# Patient Record
Sex: FEMALE | Race: WHITE | NOT HISPANIC OR LATINO | Employment: STUDENT | URBAN - METROPOLITAN AREA
[De-identification: names, ages, dates, MRNs, and addresses within clinical notes are randomized per-mention and may not be internally consistent; named-entity substitution may affect disease eponyms.]

---
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
## 2023-01-11 ENCOUNTER — TELEPHONE (OUTPATIENT)
Dept: FAMILY MEDICINE | Facility: CLINIC | Age: 18
End: 2023-01-11
Payer: COMMERCIAL

## 2023-01-11 NOTE — TELEPHONE ENCOUNTER
Are there any outstanding tasks in patient's chart?    labs  2. Do we have outstanding/pending referrals?  n    3. Has the patient been seen in an ER, Urgent Care, or admitted since last visit?  n    4. Has patient seen any other health care providers since last visit?  n    5.  Has patient had any blood work or x-rays done since last visit?   Will complete labs at [facility]   Orders in system

## 2023-01-13 ENCOUNTER — LAB VISIT (OUTPATIENT)
Dept: LAB | Facility: HOSPITAL | Age: 18
End: 2023-01-13
Attending: NURSE PRACTITIONER
Payer: COMMERCIAL

## 2023-01-13 DIAGNOSIS — Z11.4 SCREENING FOR HIV (HUMAN IMMUNODEFICIENCY VIRUS): ICD-10-CM

## 2023-01-13 DIAGNOSIS — Z11.59 NEED FOR HEPATITIS C SCREENING TEST: ICD-10-CM

## 2023-01-13 DIAGNOSIS — Z00.00 ANNUAL PHYSICAL EXAM: ICD-10-CM

## 2023-01-13 LAB
ALBUMIN SERPL-MCNC: 3.6 G/DL (ref 3.5–5)
ALBUMIN/GLOB SERPL: 1 RATIO (ref 1.1–2)
ALP SERPL-CCNC: 83 UNIT/L (ref 40–150)
ALT SERPL-CCNC: 16 UNIT/L (ref 0–55)
AST SERPL-CCNC: 16 UNIT/L (ref 5–34)
BASOPHILS # BLD AUTO: 0.03 X10(3)/MCL (ref 0–0.2)
BASOPHILS NFR BLD AUTO: 0.5 %
BILIRUBIN DIRECT+TOT PNL SERPL-MCNC: 0.4 MG/DL
BUN SERPL-MCNC: 8.2 MG/DL (ref 7–18.7)
CALCIUM SERPL-MCNC: 9.8 MG/DL (ref 8.4–10.2)
CHLORIDE SERPL-SCNC: 110 MMOL/L (ref 98–107)
CHOLEST SERPL-MCNC: 217 MG/DL
CHOLEST/HDLC SERPL: 3 {RATIO} (ref 0–5)
CO2 SERPL-SCNC: 22 MMOL/L (ref 22–29)
CREAT SERPL-MCNC: 0.95 MG/DL (ref 0.55–1.02)
EOSINOPHIL # BLD AUTO: 0.05 X10(3)/MCL (ref 0–0.9)
EOSINOPHIL NFR BLD AUTO: 0.9 %
ERYTHROCYTE [DISTWIDTH] IN BLOOD BY AUTOMATED COUNT: 12.5 % (ref 11.5–17)
GFR SERPLBLD CREATININE-BSD FMLA CKD-EPI: >60 MLS/MIN/1.73/M2
GLOBULIN SER-MCNC: 3.6 GM/DL (ref 2.4–3.5)
GLUCOSE SERPL-MCNC: 92 MG/DL (ref 74–100)
HCT VFR BLD AUTO: 39.2 % (ref 37–47)
HDLC SERPL-MCNC: 70 MG/DL (ref 35–60)
HGB BLD-MCNC: 13.1 GM/DL (ref 12–16)
HIV 1+2 AB+HIV1 P24 AG SERPL QL IA: NONREACTIVE
IMM GRANULOCYTES # BLD AUTO: 0.02 X10(3)/MCL (ref 0–0.04)
IMM GRANULOCYTES NFR BLD AUTO: 0.4 %
LDLC SERPL CALC-MCNC: 129 MG/DL (ref 50–140)
LYMPHOCYTES # BLD AUTO: 1.66 X10(3)/MCL (ref 0.6–4.6)
LYMPHOCYTES NFR BLD AUTO: 29.7 %
MCH RBC QN AUTO: 29.8 PG
MCHC RBC AUTO-ENTMCNC: 33.4 MG/DL (ref 33–36)
MCV RBC AUTO: 89.1 FL (ref 80–94)
MONOCYTES # BLD AUTO: 0.41 X10(3)/MCL (ref 0.1–1.3)
MONOCYTES NFR BLD AUTO: 7.3 %
NEUTROPHILS # BLD AUTO: 3.42 X10(3)/MCL (ref 2.1–9.2)
NEUTROPHILS NFR BLD AUTO: 61.2 %
NRBC BLD AUTO-RTO: 0 %
PLATELET # BLD AUTO: 280 X10(3)/MCL (ref 130–400)
PMV BLD AUTO: 10.9 FL (ref 7.4–10.4)
POTASSIUM SERPL-SCNC: 4 MMOL/L (ref 3.5–5.1)
PROT SERPL-MCNC: 7.2 GM/DL (ref 6.4–8.3)
RBC # BLD AUTO: 4.4 X10(6)/MCL (ref 4.2–5.4)
SODIUM SERPL-SCNC: 141 MMOL/L (ref 136–145)
TRIGL SERPL-MCNC: 92 MG/DL (ref 37–140)
TSH SERPL-ACNC: 1.67 UIU/ML (ref 0.35–4.94)
VLDLC SERPL CALC-MCNC: 18 MG/DL
WBC # SPEC AUTO: 5.6 X10(3)/MCL (ref 4.5–11.5)

## 2023-01-13 PROCEDURE — 86803 HEPATITIS C AB TEST: CPT

## 2023-01-13 PROCEDURE — 87389 HIV-1 AG W/HIV-1&-2 AB AG IA: CPT

## 2023-01-13 PROCEDURE — 84443 ASSAY THYROID STIM HORMONE: CPT

## 2023-01-13 PROCEDURE — 85025 COMPLETE CBC W/AUTO DIFF WBC: CPT

## 2023-01-13 PROCEDURE — 80061 LIPID PANEL: CPT

## 2023-01-13 PROCEDURE — 36415 COLL VENOUS BLD VENIPUNCTURE: CPT

## 2023-01-13 PROCEDURE — 80053 COMPREHEN METABOLIC PANEL: CPT

## 2023-01-16 LAB — HCV AB SERPL QL IA: NONREACTIVE

## 2023-01-17 ENCOUNTER — OFFICE VISIT (OUTPATIENT)
Dept: FAMILY MEDICINE | Facility: CLINIC | Age: 18
End: 2023-01-17
Payer: COMMERCIAL

## 2023-01-17 VITALS
HEIGHT: 65 IN | OXYGEN SATURATION: 100 % | BODY MASS INDEX: 31.29 KG/M2 | SYSTOLIC BLOOD PRESSURE: 104 MMHG | DIASTOLIC BLOOD PRESSURE: 62 MMHG | WEIGHT: 187.81 LBS | HEART RATE: 78 BPM | RESPIRATION RATE: 16 BRPM | TEMPERATURE: 100 F

## 2023-01-17 DIAGNOSIS — Z00.00 ANNUAL PHYSICAL EXAM: Primary | ICD-10-CM

## 2023-01-17 DIAGNOSIS — F41.1 GENERALIZED ANXIETY DISORDER: ICD-10-CM

## 2023-01-17 DIAGNOSIS — F90.9 ATTENTION DEFICIT HYPERACTIVITY DISORDER (ADHD), UNSPECIFIED ADHD TYPE: ICD-10-CM

## 2023-01-17 DIAGNOSIS — Z11.59 NEED FOR HEPATITIS C SCREENING TEST: ICD-10-CM

## 2023-01-17 DIAGNOSIS — N92.6 IRREGULAR MENSTRUAL CYCLE: ICD-10-CM

## 2023-01-17 DIAGNOSIS — Z11.4 SCREENING FOR HIV (HUMAN IMMUNODEFICIENCY VIRUS): ICD-10-CM

## 2023-01-17 DIAGNOSIS — Z02.0 SCHOOL PHYSICAL EXAM: ICD-10-CM

## 2023-01-17 DIAGNOSIS — J06.9 UPPER RESPIRATORY TRACT INFECTION, UNSPECIFIED TYPE: ICD-10-CM

## 2023-01-17 DIAGNOSIS — E78.2 MIXED HYPERLIPIDEMIA: ICD-10-CM

## 2023-01-17 PROCEDURE — 1160F PR REVIEW ALL MEDS BY PRESCRIBER/CLIN PHARMACIST DOCUMENTED: ICD-10-PCS | Mod: CPTII,,, | Performed by: NURSE PRACTITIONER

## 2023-01-17 PROCEDURE — 3008F PR BODY MASS INDEX (BMI) DOCUMENTED: ICD-10-PCS | Mod: CPTII,,, | Performed by: NURSE PRACTITIONER

## 2023-01-17 PROCEDURE — 3078F DIAST BP <80 MM HG: CPT | Mod: CPTII,,, | Performed by: NURSE PRACTITIONER

## 2023-01-17 PROCEDURE — 3078F PR MOST RECENT DIASTOLIC BLOOD PRESSURE < 80 MM HG: ICD-10-PCS | Mod: CPTII,,, | Performed by: NURSE PRACTITIONER

## 2023-01-17 PROCEDURE — 1159F PR MEDICATION LIST DOCUMENTED IN MEDICAL RECORD: ICD-10-PCS | Mod: CPTII,,, | Performed by: NURSE PRACTITIONER

## 2023-01-17 PROCEDURE — 3074F PR MOST RECENT SYSTOLIC BLOOD PRESSURE < 130 MM HG: ICD-10-PCS | Mod: CPTII,,, | Performed by: NURSE PRACTITIONER

## 2023-01-17 PROCEDURE — 99395 PREV VISIT EST AGE 18-39: CPT | Mod: ,,, | Performed by: NURSE PRACTITIONER

## 2023-01-17 PROCEDURE — 1160F RVW MEDS BY RX/DR IN RCRD: CPT | Mod: CPTII,,, | Performed by: NURSE PRACTITIONER

## 2023-01-17 PROCEDURE — 3074F SYST BP LT 130 MM HG: CPT | Mod: CPTII,,, | Performed by: NURSE PRACTITIONER

## 2023-01-17 PROCEDURE — 3008F BODY MASS INDEX DOCD: CPT | Mod: CPTII,,, | Performed by: NURSE PRACTITIONER

## 2023-01-17 PROCEDURE — 1159F MED LIST DOCD IN RCRD: CPT | Mod: CPTII,,, | Performed by: NURSE PRACTITIONER

## 2023-01-17 PROCEDURE — 99395 PR PREVENTIVE VISIT,EST,18-39: ICD-10-PCS | Mod: ,,, | Performed by: NURSE PRACTITIONER

## 2023-01-17 RX ORDER — AZITHROMYCIN 250 MG/1
TABLET, FILM COATED ORAL
Qty: 6 TABLET | Refills: 0 | Status: SHIPPED | OUTPATIENT
Start: 2023-01-17 | End: 2023-01-22

## 2023-01-17 RX ORDER — METHYLPREDNISOLONE 4 MG/1
TABLET ORAL
Qty: 21 EACH | Refills: 0 | Status: SHIPPED | OUTPATIENT
Start: 2023-01-17 | End: 2023-02-07

## 2023-01-17 NOTE — ASSESSMENT & PLAN NOTE
Patient has not taken Paxil since the summer.  Feels like she is doing fine without it.  Follow-up 3 months.

## 2023-01-17 NOTE — LETTER
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  Student is currently under my care for ADHD.  She takes Concerta 36 mg daily for the treatment of her ADHD. She keeps all of her regularly scheduled visits with me every 3 months.     Sirisha Greene, CJP-BC

## 2023-01-17 NOTE — PROGRESS NOTES
Subjective:       Patient ID: Vee Aleman is a 17 y.o. female.    Chief Complaint: Annual Exam      HPI   This is a 17-year-old  female who presents to clinic today for an annual wellness exam.  Patient has a history of ADHD, anxiety, hyperlipidemia, irregular menstrual cycles.  Patient states that overall she is doing well with medication.  States that she is been having breakthrough bleeding with her menstrual cycle now for the past few months.  States that she saw Dr. Sinclair and they spoke about it and she was supposed to send her in a different kind of birth control but when she got to the pharmacy she realized that she just refilled the same exact birth control she has been on.  States that this happened at her previous years appointment to.  She is been complaining about being on this birth control now for the last couple of years and Dr. Sinclair says she is going to change it but sounds in the same prescription any ways.  She would like to look into getting a new gyn.  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  Also she has nasal congestion, cough that has been going on since November.  Was seen at urgent care but has not gotten any better.  Was not treated with antibiotics or steroids, just cough medicine.  Review of Systems   Constitutional: Negative.    HENT:  Positive for nasal congestion and sinus pressure/congestion.    Eyes: Negative.    Respiratory:  Positive for cough.    Cardiovascular: Negative.    Gastrointestinal: Negative.    Endocrine: Negative.    Genitourinary: Negative.  Positive for menstrual irregularity.   Musculoskeletal: Negative.    Integumentary:  Negative.   Allergic/Immunologic: Negative.    Neurological: Negative.    Hematological: Negative.    Psychiatric/Behavioral: Negative.     All other systems reviewed and are negative.  Comprehensive review of systems negative except as stated in HPI    The patient's Health Maintenance was reviewed and the  "following appears to be due:   There are no preventive care reminders to display for this patient.    Past Medical History:  Past Medical History:   Diagnosis Date    Anxiety disorder, unspecified     Attention-deficit hyperactivity disorder, unspecified type     Chronic constipation     Diabetes mellitus, type 2     Exercise-induced asthma     Obesity, unspecified     Verruca vulgaris      Past Surgical History:   Procedure Laterality Date    ADENOIDECTOMY       Review of patient's allergies indicates:  No Known Allergies  Current Outpatient Medications on File Prior to Visit   Medication Sig Dispense Refill    drospirenone-ethinyl estradioL (AUSTIN) 3-0.02 mg per tablet Take 1 tablet by mouth once daily.      methylphenidate HCl (CONCERTA) 36 MG CR tablet Take 1 tablet (36 mg total) by mouth once daily. 30 tablet 0    [DISCONTINUED] mupirocin (BACTROBAN) 2 % ointment Apply topically 3 (three) times daily. 22 g 0    [DISCONTINUED] paroxetine (PAXIL) 20 MG tablet Take 20 mg by mouth once daily.      [DISCONTINUED] vitamin D (VITAMIN D3) 1000 units Tab Take 1,000 Units by mouth once daily.      [DISCONTINUED] zinc gluconate 50 mg tablet Take 50 mg by mouth once daily.       No current facility-administered medications on file prior to visit.     Social History     Socioeconomic History    Marital status: Single   Tobacco Use    Smoking status: Never    Smokeless tobacco: Never   Substance and Sexual Activity    Alcohol use: Yes     Comment: occasional    Drug use: Never    Sexual activity: Not Currently     Family History   Problem Relation Age of Onset    No Known Problems Mother     No Known Problems Father        Objective:       /62 (BP Location: Left arm)   Pulse 78   Temp 99.5 °F (37.5 °C) (Oral)   Resp 16   Ht 5' 4.5" (1.638 m)   Wt 85.2 kg (187 lb 12.8 oz)   SpO2 100%   BMI 31.74 kg/m²      Physical Exam  Vitals and nursing note reviewed.   Constitutional:       Appearance: Normal appearance. She is " obese.   HENT:      Head: Normocephalic and atraumatic.      Right Ear: Tympanic membrane, ear canal and external ear normal.      Left Ear: Tympanic membrane, ear canal and external ear normal.      Nose: Congestion present.      Mouth/Throat:      Mouth: Mucous membranes are moist.      Pharynx: Oropharynx is clear.   Eyes:      Extraocular Movements: Extraocular movements intact.      Conjunctiva/sclera: Conjunctivae normal.      Pupils: Pupils are equal, round, and reactive to light.   Cardiovascular:      Rate and Rhythm: Normal rate and regular rhythm.      Heart sounds: Normal heart sounds.   Pulmonary:      Effort: Pulmonary effort is normal.      Breath sounds: Normal breath sounds.   Abdominal:      General: Abdomen is flat. Bowel sounds are normal.      Palpations: Abdomen is soft.   Musculoskeletal:         General: Normal range of motion.      Cervical back: Normal range of motion and neck supple.   Skin:     General: Skin is warm and dry.   Neurological:      General: No focal deficit present.      Mental Status: She is alert and oriented to person, place, and time.   Psychiatric:         Mood and Affect: Mood normal.         Behavior: Behavior normal.         Thought Content: Thought content normal.         Judgment: Judgment normal.       Labs  Lab Visit on 01/13/2023   Component Date Value Ref Range Status    Sodium Level 01/13/2023 141  136 - 145 mmol/L Final    Potassium Level 01/13/2023 4.0  3.5 - 5.1 mmol/L Final    Chloride 01/13/2023 110 (H)  98 - 107 mmol/L Final    Carbon Dioxide 01/13/2023 22  22 - 29 mmol/L Final    Glucose Level 01/13/2023 92  74 - 100 mg/dL Final    Blood Urea Nitrogen 01/13/2023 8.2  7.0 - 18.7 mg/dL Final    Creatinine 01/13/2023 0.95  0.55 - 1.02 mg/dL Final    Calcium Level Total 01/13/2023 9.8  8.4 - 10.2 mg/dL Final    Protein Total 01/13/2023 7.2  6.4 - 8.3 gm/dL Final    Albumin Level 01/13/2023 3.6  3.5 - 5.0 g/dL Final    Globulin 01/13/2023 3.6 (H)  2.4 - 3.5  gm/dL Final    Albumin/Globulin Ratio 01/13/2023 1.0 (L)  1.1 - 2.0 ratio Final    Bilirubin Total 01/13/2023 0.4  <=1.5 mg/dL Final    Alkaline Phosphatase 01/13/2023 83  40 - 150 unit/L Final    Alanine Aminotransferase 01/13/2023 16  0 - 55 unit/L Final    Aspartate Aminotransferase 01/13/2023 16  5 - 34 unit/L Final    eGFR 01/13/2023 >60  mls/min/1.73/m2 Final    Cholesterol Total 01/13/2023 217 (H)  <=200 mg/dL Final    HDL Cholesterol 01/13/2023 70 (H)  35 - 60 mg/dL Final    Triglyceride 01/13/2023 92  37 - 140 mg/dL Final    Cholesterol/HDL Ratio 01/13/2023 3  0 - 5 Final    Very Low Density Lipoprotein 01/13/2023 18   Final    LDL Cholesterol 01/13/2023 129.00  50.00 - 140.00 mg/dL Final    Thyroid Stimulating Hormone 01/13/2023 1.666  0.350 - 4.940 uIU/mL Final    Hepatitis C Antibody 01/13/2023 Nonreactive  Nonreactive Final    HIV 01/13/2023 Nonreactive  Nonreactive Final    WBC 01/13/2023 5.6  4.5 - 11.5 x10(3)/mcL Final    RBC 01/13/2023 4.40  4.20 - 5.40 x10(6)/mcL Final    Hgb 01/13/2023 13.1  12.0 - 16.0 gm/dL Final    Hct 01/13/2023 39.2  37.0 - 47.0 % Final    MCV 01/13/2023 89.1  80.0 - 94.0 fL Final    MCH 01/13/2023 29.8  pg Final    MCHC 01/13/2023 33.4  33.0 - 36.0 mg/dL Final    RDW 01/13/2023 12.5  11.5 - 17.0 % Final    Platelet 01/13/2023 280  130 - 400 x10(3)/mcL Final    MPV 01/13/2023 10.9 (H)  7.4 - 10.4 fL Final    Neut % 01/13/2023 61.2  % Final    Lymph % 01/13/2023 29.7  % Final    Mono % 01/13/2023 7.3  % Final    Eos % 01/13/2023 0.9  % Final    Basophil % 01/13/2023 0.5  % Final    Lymph # 01/13/2023 1.66  0.6 - 4.6 x10(3)/mcL Final    Neut # 01/13/2023 3.42  2.1 - 9.2 x10(3)/mcL Final    Mono # 01/13/2023 0.41  0.1 - 1.3 x10(3)/mcL Final    Eos # 01/13/2023 0.05  0 - 0.9 x10(3)/mcL Final    Baso # 01/13/2023 0.03  0 - 0.2 x10(3)/mcL Final    IG# 01/13/2023 0.02  0 - 0.04 x10(3)/mcL Final    IG% 01/13/2023 0.4  % Final    NRBC% 01/13/2023 0.0  % Final         Assessment and  Plan       ICD-10-CM ICD-9-CM   1. Annual physical exam  Z00.00 V70.0   2. Need for hepatitis C screening test  Z11.59 V73.89   3. Screening for HIV (human immunodeficiency virus)  Z11.4 V73.89   4. Attention deficit hyperactivity disorder (ADHD), unspecified ADHD type  F90.9 314.01   5. Generalized anxiety disorder  F41.1 300.02   6. Mixed hyperlipidemia  E78.2 272.2   7. Irregular menstrual cycle  N92.6 626.4        1. Annual physical exam    2. Need for hepatitis C screening test  Comments:  Nonreactive    3. Screening for HIV (human immunodeficiency virus)  Comments:  Nonreactive    4. Attention deficit hyperactivity disorder (ADHD), unspecified ADHD type  Overview:  DX as a child - tried Concerta, Vyvanse, Adzenys - all increased her anxiety and gave her nightmares  08/29/2018 - Mydayis 12.5 mg daily  09/14/2018 - DC Mydayis, made her irritable, start Focalin XR 15 mg daily  09/26/2018 - Focalin XR 20 mg   11/14/2018 - Focalin XR 30 mg  October 2019 - stopped ADHD medication, patient wanted to try without it   09/01/2020 - Adderall XR 20 mg daily  11/09/2020 - Adderall XR 25 mg  02/11/2021 - stop Adderall due to anger issues, start Concerta 36 mg            5. Generalized anxiety disorder  Overview:  07/12/2021 - Zoloft 25 mg daily  04/21/2022 - DC Zoloft (wasn't working), start Paxil 20 mg       6. Mixed hyperlipidemia    7. Irregular menstrual cycle  -     Ambulatory referral/consult to Obstetrics / Gynecology; Future; Expected date: 01/24/2023           Follow up in about 3 months (around 4/17/2023) for follow up.

## 2023-01-24 ENCOUNTER — PATIENT MESSAGE (OUTPATIENT)
Dept: FAMILY MEDICINE | Facility: CLINIC | Age: 18
End: 2023-01-24
Payer: COMMERCIAL

## 2023-01-24 RX ORDER — METRONIDAZOLE 7.5 MG/G
1 GEL VAGINAL 2 TIMES DAILY
Qty: 10 APPLICATOR | Refills: 1 | Status: SHIPPED | OUTPATIENT
Start: 2023-01-24 | End: 2023-04-19 | Stop reason: ALTCHOICE

## 2023-04-12 ENCOUNTER — TELEPHONE (OUTPATIENT)
Dept: FAMILY MEDICINE | Facility: CLINIC | Age: 18
End: 2023-04-12
Payer: COMMERCIAL

## 2023-04-19 ENCOUNTER — OFFICE VISIT (OUTPATIENT)
Dept: FAMILY MEDICINE | Facility: CLINIC | Age: 18
End: 2023-04-19
Payer: COMMERCIAL

## 2023-04-19 VITALS
RESPIRATION RATE: 16 BRPM | TEMPERATURE: 99 F | HEART RATE: 68 BPM | WEIGHT: 186 LBS | OXYGEN SATURATION: 98 % | HEIGHT: 65 IN | SYSTOLIC BLOOD PRESSURE: 110 MMHG | BODY MASS INDEX: 30.99 KG/M2 | DIASTOLIC BLOOD PRESSURE: 72 MMHG

## 2023-04-19 DIAGNOSIS — F90.9 ATTENTION DEFICIT HYPERACTIVITY DISORDER (ADHD), UNSPECIFIED ADHD TYPE: ICD-10-CM

## 2023-04-19 DIAGNOSIS — N92.6 IRREGULAR MENSTRUAL CYCLE: ICD-10-CM

## 2023-04-19 DIAGNOSIS — F41.1 GENERALIZED ANXIETY DISORDER: ICD-10-CM

## 2023-04-19 PROCEDURE — 99213 PR OFFICE/OUTPT VISIT, EST, LEVL III, 20-29 MIN: ICD-10-PCS | Mod: ,,, | Performed by: NURSE PRACTITIONER

## 2023-04-19 PROCEDURE — 3078F DIAST BP <80 MM HG: CPT | Mod: CPTII,,, | Performed by: NURSE PRACTITIONER

## 2023-04-19 PROCEDURE — 3008F BODY MASS INDEX DOCD: CPT | Mod: CPTII,,, | Performed by: NURSE PRACTITIONER

## 2023-04-19 PROCEDURE — 3074F PR MOST RECENT SYSTOLIC BLOOD PRESSURE < 130 MM HG: ICD-10-PCS | Mod: CPTII,,, | Performed by: NURSE PRACTITIONER

## 2023-04-19 PROCEDURE — 1159F MED LIST DOCD IN RCRD: CPT | Mod: CPTII,,, | Performed by: NURSE PRACTITIONER

## 2023-04-19 PROCEDURE — 3008F PR BODY MASS INDEX (BMI) DOCUMENTED: ICD-10-PCS | Mod: CPTII,,, | Performed by: NURSE PRACTITIONER

## 2023-04-19 PROCEDURE — 1159F PR MEDICATION LIST DOCUMENTED IN MEDICAL RECORD: ICD-10-PCS | Mod: CPTII,,, | Performed by: NURSE PRACTITIONER

## 2023-04-19 PROCEDURE — 1160F PR REVIEW ALL MEDS BY PRESCRIBER/CLIN PHARMACIST DOCUMENTED: ICD-10-PCS | Mod: CPTII,,, | Performed by: NURSE PRACTITIONER

## 2023-04-19 PROCEDURE — 99213 OFFICE O/P EST LOW 20 MIN: CPT | Mod: ,,, | Performed by: NURSE PRACTITIONER

## 2023-04-19 PROCEDURE — 3074F SYST BP LT 130 MM HG: CPT | Mod: CPTII,,, | Performed by: NURSE PRACTITIONER

## 2023-04-19 PROCEDURE — 3078F PR MOST RECENT DIASTOLIC BLOOD PRESSURE < 80 MM HG: ICD-10-PCS | Mod: CPTII,,, | Performed by: NURSE PRACTITIONER

## 2023-04-19 PROCEDURE — 1160F RVW MEDS BY RX/DR IN RCRD: CPT | Mod: CPTII,,, | Performed by: NURSE PRACTITIONER

## 2023-04-19 RX ORDER — FLURBIPROFEN 100 MG/1
100 TABLET, FILM COATED ORAL 3 TIMES DAILY
COMMUNITY
Start: 2023-02-07

## 2023-04-19 RX ORDER — DESOGESTREL AND ETHINYL ESTRADIOL 0.15-0.03
1 KIT ORAL
COMMUNITY
Start: 2023-04-04

## 2023-04-19 NOTE — ASSESSMENT & PLAN NOTE
Patient stopped her Concerta due to mood swings.  States that she is no longer in school and is doing okay without it.  Follow-up in January with wellness as scheduled.

## 2023-04-19 NOTE — LETTER
AUTHORIZATION FOR RELEASE OF   CONFIDENTIAL INFORMATION    Dear Maribeth Michelle NP,    We are seeing Vee Aleman, date of birth 2005, in the clinic at [facility]. DENISE Muniz is the patient's PCP. Vee Aleman has an outstanding lab/procedure at the time we reviewed her chart. In order to help keep her health information updated, she has authorized us to request the following medical record(s):        (  )  MAMMOGRAM                                      (  )  COLONOSCOPY      (  )  PAP SMEAR                                          (  )  OUTSIDE LAB RESULTS     (  )  DEXA SCAN                                          (  )  EYE EXAM            (  )  FOOT EXAM                                          (  )  ENTIRE RECORD     (  )  OUTSIDE IMMUNIZATIONS                 (X)  OFFICE VISIT NOTE         Please fax records to [facility], Kathryn F Duplantis, FNP, [phone]     If you have any questions, please contact us at [phone].          Patient Name: Vee Aleman  : 2005  Patient Phone #: [phone]

## 2023-04-19 NOTE — ASSESSMENT & PLAN NOTE
Patient recently saw gyn and had birth control changed.  They started her on a sample medication, she thinks it is Wellbutrin, for  Her anxiety.  Instructed patient to please send us a [employer] message when she gets home to let us know what medication she is taking.  Patient verbalized understanding.  Follow-up in January with wellness as scheduled.

## 2023-04-19 NOTE — PROGRESS NOTES
Subjective:       Patient ID: Vee Aleman is a 17 y.o. female.    Chief Complaint: ADHD (3 month f/u/D/c'd med d/t feeling agitated when medication was wearing off.) and Anxiety (3 month f/u)      HPI     This is a 17-year-old  female who presents to clinic today for 3 month follow-up for ADHD and anxiety.  Patient states she stopped her ADHD medication a few months ago because she was having mood swings again when it was wearing off.  States that she saw a gyn and her birth control was changed.  This is helping her irregular cycles.  States the gyn put her on Wellbutrin for her anxiety.  She follows up with them soon.  Review of Systems  Comprehensive review of systems negative except as stated in HPI    The patient's Health Maintenance was reviewed and the following appears to be due:   There are no preventive care reminders to display for this patient.    Past Medical History:  Past Medical History:   Diagnosis Date    Anxiety disorder, unspecified     Attention-deficit hyperactivity disorder, unspecified type     Chronic constipation     Diabetes mellitus, type 2     Exercise-induced asthma     Obesity, unspecified     Verruca vulgaris      Past Surgical History:   Procedure Laterality Date    ADENOIDECTOMY       Review of patient's allergies indicates:  No Known Allergies  Current Outpatient Medications on File Prior to Visit   Medication Sig Dispense Refill    flurbiprofen (ANSAID) 100 MG tablet Take 100 mg by mouth 3 (three) times daily.      ISIBLOOM 0.15-0.03 mg per tablet Take 1 tablet by mouth.      bupropion HCl (WELLBUTRIN ORAL) Take by mouth.      [DISCONTINUED] drospirenone-ethinyl estradioL (AUSTIN) 3-0.02 mg per tablet Take 1 tablet by mouth once daily.      [DISCONTINUED] methylphenidate HCl (CONCERTA) 36 MG CR tablet Take 1 tablet (36 mg total) by mouth once daily. 30 tablet 0    [DISCONTINUED] metroNIDAZOLE (METROGEL) 0.75 % (37.5mg/5 gram) vaginal gel Place 1 applicator vaginally  "2 (two) times daily. 10 applicator 1     No current facility-administered medications on file prior to visit.     Social History     Socioeconomic History    Marital status: Single   Tobacco Use    Smoking status: Never     Passive exposure: Never    Smokeless tobacco: Never   Substance and Sexual Activity    Alcohol use: Yes     Comment: occasional    Drug use: Never    Sexual activity: Not Currently     Partners: Male     Social Determinants of Health     Financial Resource Strain: Unknown    Difficulty of Paying Living Expenses: Patient refused   Food Insecurity: No Food Insecurity    Worried About Running Out of Food in the Last Year: Never true    Ran Out of Food in the Last Year: Never true   Transportation Needs: No Transportation Needs    Lack of Transportation (Medical): No    Lack of Transportation (Non-Medical): No   Physical Activity: Sufficiently Active    Days of Exercise per Week: 4 days    Minutes of Exercise per Session: 50 min   Stress: Stress Concern Present    Feeling of Stress : To some extent   Social Connections: Socially Isolated    Frequency of Communication with Friends and Family: Twice a week    Frequency of Social Gatherings with Friends and Family: Once a week    Attends [employer] Services: Never    Active Member of Clubs or Organizations: No    Attends Club or Organization Meetings: Never    Marital Status: Never    Housing Stability: Low Risk     Unable to Pay for Housing in the Last Year: No    Number of Places Lived in the Last Year: 2    Unstable Housing in the Last Year: No     Family History   Problem Relation Age of Onset    No Known Problems Mother     No Known Problems Father        Objective:       /72 (BP Location: Left arm)   Pulse 68   Temp 98.8 °F (37.1 °C) (Oral)   Resp 16   Ht 5' 4.5" (1.638 m)   Wt 84.4 kg (186 lb)   LMP 03/29/2023 (Within Days)   SpO2 98%   BMI 31.43 kg/m²      Physical Exam  Vitals and nursing note reviewed.   Constitutional:     "   Appearance: Normal appearance.   HENT:      Head: Normocephalic and atraumatic.      Right Ear: Tympanic membrane, ear canal and external ear normal.      Left Ear: Tympanic membrane, ear canal and external ear normal.      Nose: Nose normal.      Mouth/Throat:      Mouth: Mucous membranes are moist.      Pharynx: Oropharynx is clear.   Eyes:      Extraocular Movements: Extraocular movements intact.      Conjunctiva/sclera: Conjunctivae normal.      Pupils: Pupils are equal, round, and reactive to light.   Cardiovascular:      Rate and Rhythm: Normal rate and regular rhythm.      Heart sounds: Normal heart sounds.   Pulmonary:      Effort: Pulmonary effort is normal.      Breath sounds: Normal breath sounds.   Musculoskeletal:         General: Normal range of motion.      Cervical back: Normal range of motion and neck supple.   Skin:     General: Skin is warm and dry.   Neurological:      General: No focal deficit present.      Mental Status: She is alert and oriented to person, place, and time.   Psychiatric:         Mood and Affect: Mood normal.         Behavior: Behavior normal.         Thought Content: Thought content normal.         Judgment: Judgment normal.           Assessment and Plan       ICD-10-CM ICD-9-CM   1. Attention deficit hyperactivity disorder (ADHD), unspecified ADHD type  F90.9 314.01   2. Generalized anxiety disorder  F41.1 300.02   3. Irregular menstrual cycle  N92.6 626.4        1. Attention deficit hyperactivity disorder (ADHD), unspecified ADHD type  Overview:  DX as a child - tried Concerta, Vyvanse, Adzenys - all increased her anxiety and gave her nightmares  08/29/2018 - Mydayis 12.5 mg daily  09/14/2018 - DC Mydayis, made her irritable, start Focalin XR 15 mg daily  09/26/2018 - Focalin XR 20 mg   11/14/2018 - Focalin XR 30 mg  October 2019 - stopped ADHD medication, patient wanted to try without it   09/01/2020 - Adderall XR 20 mg daily  11/09/2020 - Adderall XR 25 mg  02/11/2021 -  stop Adderall due to anger issues, start Concerta 36 mg  January 2023 - stopped Concerta due to mood swings            Assessment & Plan:   Patient stopped her Concerta due to mood swings.  States that she is no longer in school and is doing okay without it.  Follow-up in January with wellness as scheduled.      2. Generalized anxiety disorder  Overview:  07/12/2021 - Zoloft 25 mg daily  04/21/2022 - DC Zoloft (wasn't working), start Paxil 20 mg   June 2022 - discontinued Paxil    Assessment & Plan:    Patient recently saw gyn and had birth control changed.  They started her on a sample medication, she thinks it is Wellbutrin, for  Her anxiety.  Instructed patient to please send us a [employer] message when she gets home to let us know what medication she is taking.  Patient verbalized understanding.  Follow-up in January with wellness as scheduled.      3. Irregular menstrual cycle  Overview:  Started Diana in 2019 per Dr Sinclair    Assessment & Plan:    Patient was referred to a different gyn because Dr. Chatterjee  Was not taking new patients.  She saw Maribeth Michelle nurse practitioner, and birth control was changed.  This is helping.  Follow-up with gyn as scheduled.             Follow up in 9 months (on 1/18/2024) for Annual.

## 2023-04-19 NOTE — ASSESSMENT & PLAN NOTE
Patient was referred to a different gyn because Dr. Chatterjee  Was not taking new patients.  She saw Maribeth Michelle nurse practitioner, and birth control was changed.  This is helping.  Follow-up with gyn as scheduled.

## 2023-04-24 PROBLEM — Z00.00 ANNUAL PHYSICAL EXAM: Status: RESOLVED | Noted: 2023-01-17 | Resolved: 2023-04-24

## 2023-07-06 ENCOUNTER — PATIENT MESSAGE (OUTPATIENT)
Dept: FAMILY MEDICINE | Facility: CLINIC | Age: 18
End: 2023-07-06
Payer: COMMERCIAL

## 2024-01-10 ENCOUNTER — TELEPHONE (OUTPATIENT)
Dept: FAMILY MEDICINE | Facility: CLINIC | Age: 19
End: 2024-01-10
Payer: COMMERCIAL

## 2024-01-10 ENCOUNTER — PATIENT MESSAGE (OUTPATIENT)
Dept: FAMILY MEDICINE | Facility: CLINIC | Age: 19
End: 2024-01-10
Payer: COMMERCIAL

## 2024-01-10 DIAGNOSIS — Z00.00 ANNUAL PHYSICAL EXAM: Primary | ICD-10-CM

## 2024-01-10 DIAGNOSIS — Z13.1 SCREENING FOR DIABETES MELLITUS: ICD-10-CM

## 2024-01-10 NOTE — TELEPHONE ENCOUNTER
No answer on 1/10/24 at 9:15.  Left message reminding patient about her visit and labs needed prior to appointment.  Message sent to patient through portal.

## 2024-02-26 ENCOUNTER — TELEPHONE (OUTPATIENT)
Dept: FAMILY MEDICINE | Facility: CLINIC | Age: 19
End: 2024-02-26
Payer: COMMERCIAL

## 2024-02-26 ENCOUNTER — PATIENT MESSAGE (OUTPATIENT)
Dept: FAMILY MEDICINE | Facility: CLINIC | Age: 19
End: 2024-02-26
Payer: COMMERCIAL

## 2024-02-26 NOTE — TELEPHONE ENCOUNTER
Attempted to contact patient for previsit call on 2/26/24 at 10:33. Message left reminding her about labs and upcoming visit.

## 2024-04-04 ENCOUNTER — HOSPITAL ENCOUNTER (EMERGENCY)
Facility: HOSPITAL | Age: 19
Discharge: HOME | End: 2024-04-04
Attending: STUDENT IN AN ORGANIZED HEALTH CARE EDUCATION/TRAINING PROGRAM
Payer: COMMERCIAL

## 2024-04-04 VITALS
SYSTOLIC BLOOD PRESSURE: 121 MMHG | RESPIRATION RATE: 18 BRPM | HEIGHT: 64 IN | WEIGHT: 165 LBS | HEART RATE: 76 BPM | BODY MASS INDEX: 28.17 KG/M2 | TEMPERATURE: 99 F | OXYGEN SATURATION: 100 % | DIASTOLIC BLOOD PRESSURE: 75 MMHG
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
## 2024-04-04 DIAGNOSIS — S02.2XXA CLOSED FRACTURE OF NASAL BONE, INITIAL ENCOUNTER: ICD-10-CM

## 2024-04-04 LAB — B-HCG SERPL QL: NEGATIVE

## 2024-04-04 PROCEDURE — 99284 EMERGENCY DEPT VISIT MOD MDM: CPT | Mod: 25

## 2024-04-04 PROCEDURE — 81025 URINE PREGNANCY TEST: CPT

## 2024-04-04 PROCEDURE — 25000003 PHARM REV CODE 250: Performed by: PHYSICIAN ASSISTANT

## 2024-04-04 RX ORDER — CHLORHEXIDINE GLUCONATE ORAL RINSE 1.2 MG/ML
15 SOLUTION DENTAL 2 TIMES DAILY
Qty: 420 ML | Refills: 0 | Status: SHIPPED | OUTPATIENT
Start: 2024-04-04 | End: 2024-04-18

## 2024-04-04 RX ORDER — AMOXICILLIN AND CLAVULANATE POTASSIUM 875; 125 MG/1; MG/1
1 TABLET, FILM COATED ORAL 2 TIMES DAILY
Qty: 14 TABLET | Refills: 0 | Status: SHIPPED | OUTPATIENT
Start: 2024-04-04

## 2024-04-04 RX ORDER — IBUPROFEN 600 MG/1
600 TABLET ORAL EVERY 6 HOURS PRN
Qty: 20 TABLET | Refills: 0 | Status: SHIPPED | OUTPATIENT
Start: 2024-04-04

## 2024-04-04 RX ORDER — HYDROCODONE BITARTRATE AND ACETAMINOPHEN 10; 325 MG/1; MG/1
1 TABLET ORAL
Status: COMPLETED | OUTPATIENT
Start: 2024-04-04 | End: 2024-04-04

## 2024-04-04 RX ORDER — HYDROCODONE BITARTRATE AND ACETAMINOPHEN 5; 325 MG/1; MG/1
1 TABLET ORAL EVERY 6 HOURS PRN
Qty: 20 TABLET | Refills: 0 | Status: SHIPPED | OUTPATIENT
Start: 2024-04-04 | End: 2024-04-09

## 2024-04-04 RX ADMIN — HYDROCODONE BITARTRATE AND ACETAMINOPHEN 1 TABLET: 10; 325 TABLET ORAL at 11:04

## 2024-04-05 NOTE — ED NOTES
Pt dc home she and friend states understanding of dc rx and the need for follow up with dentist in the next 7 days pt was given copy of local dentist. Pt was instructed on ice pack and the need to complete all antibiotics

## 2024-04-05 NOTE — DISCHARGE INSTRUCTIONS
Try not to blow your nose.  Use ibuprofen and Tylenol as needed for pain.  May use Norco for severe pain.  Swish and spit Peridex.  Take full course of antibiotics.  Follow-up with dentist for further evaluation of displaced teeth.    [facility] [address] [place] [address] [phone] Medicaid/Medicare   Dr. Vitaliy Walker, DDS  [address]  [place] [address] [phone] Medicaid   [facility] [address] [place] [address] [phone] Medicaid   [facility] [address] [place] [address] [phone] Medicare [facility]  [address] [place] [address] [phone] Medicaid/Medicare   [facility] [address] [place] [address] [phone] Medicaid   [facility]  [address]  [place] [address] [phone] Medicaid for ages 5 and under; or for lip/tongue tie    Dr. Derian Mccloud, DDS [address] [place] [address] [phone] Medicaid-only for children ages 2 to 21   [facility] [address]  [place] [address] [phone] Medicaid   [facility] [address] [place] [address] [phone] Medicare [facility] [address] [place] [address] [phone] Medicaid   [facility] [address] [place] [address] [phone] Medicaid-Pediatric dentistry    [facility] [address] [place] [address] [phone] Medicaid/Medicare   [facility] [address] [place] [address] [phone] Medicaid/Medicare   [facility] [address]  [place] [address] [phone] Medicaid/Medicare   [facility] [address] [place] [address] [phone] Medicaid/Medicare   [facility] [address]  [place] [address] [phone] Medicaid/Medicare   [facility] [address] [place] [address] [phone] Medicaid-Pediatric and adult   [facility] [address]  [place] [address] [phone] Medicaid   [facility] [address] [place] [address] [phone] Medicare   Dr. Henny Bolton,  [place] [address] [phone] Medicare for dentures only
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
## 2024-07-25 ENCOUNTER — TELEPHONE (OUTPATIENT)
Dept: FAMILY MEDICINE | Facility: CLINIC | Age: 19
End: 2024-07-25
Payer: COMMERCIAL

## 2024-07-25 NOTE — LETTER
AUTHORIZATION FOR RELEASE OF   CONFIDENTIAL INFORMATION    Dear [facility],    We are seeing Vee Aleman, date of birth 2005, in the clinic at [facility]. Sirisha Greene FNP is the patient's PCP. Vee Aleman has an outstanding lab/procedure at the time we reviewed her chart. In order to help keep her health information updated, she has authorized us to request the following medical record(s):        (  )  MAMMOGRAM                                      (  )  COLONOSCOPY      ( x )  PAP SMEAR  (last 3 years)                 (  )  OUTSIDE LAB RESULTS     (  )  DEXA SCAN                                          (  )  EYE EXAM            (  )  FOOT EXAM                                          (  )  ENTIRE RECORD     (  )  OUTSIDE IMMUNIZATIONS                 (  )  _______________         Please fax records to [facility], [place], [address] [place], [phone]     If you have any questions, please contact [phone]          Patient Name: Vee Aleman  : 2005  Patient Phone #: [phone]      1